# Patient Record
Sex: MALE | Race: WHITE | HISPANIC OR LATINO | Employment: STUDENT | URBAN - METROPOLITAN AREA
[De-identification: names, ages, dates, MRNs, and addresses within clinical notes are randomized per-mention and may not be internally consistent; named-entity substitution may affect disease eponyms.]

---
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
## 2025-03-30 ENCOUNTER — APPOINTMENT (EMERGENCY)
Dept: RADIOLOGY | Facility: HOSPITAL | Age: 20
End: 2025-03-30

## 2025-03-30 ENCOUNTER — HOSPITAL ENCOUNTER (EMERGENCY)
Facility: HOSPITAL | Age: 20
Discharge: HOME | End: 2025-03-30
Attending: EMERGENCY MEDICINE

## 2025-03-30 VITALS
TEMPERATURE: 99.6 F | HEART RATE: 68 BPM | OXYGEN SATURATION: 98 % | SYSTOLIC BLOOD PRESSURE: 120 MMHG | DIASTOLIC BLOOD PRESSURE: 56 MMHG | RESPIRATION RATE: 18 BRPM

## 2025-03-30 DIAGNOSIS — S93.601A RIGHT FOOT SPRAIN: Primary | ICD-10-CM

## 2025-03-30 PROCEDURE — 99284 EMERGENCY DEPT VISIT MOD MDM: CPT | Performed by: EMERGENCY MEDICINE

## 2025-03-30 PROCEDURE — 73630 X-RAY EXAM OF FOOT: CPT

## 2025-03-30 PROCEDURE — 99283 EMERGENCY DEPT VISIT LOW MDM: CPT

## 2025-03-30 RX ORDER — IBUPROFEN 800 MG/1
800 TABLET, FILM COATED ORAL EVERY 8 HOURS PRN
Qty: 15 TABLET | Refills: 0 | Status: SHIPPED | OUTPATIENT
Start: 2025-03-30

## 2025-03-30 RX ORDER — IBUPROFEN 400 MG/1
800 TABLET, FILM COATED ORAL ONCE
Status: COMPLETED | OUTPATIENT
Start: 2025-03-30 | End: 2025-03-30

## 2025-03-30 RX ADMIN — IBUPROFEN 800 MG: 400 TABLET, FILM COATED ORAL at 21:37

## 2025-03-30 NOTE — Clinical Note
Bhavesh Tse was seen and treated in our emergency department on 3/30/2025.            light duty with limiting weight bearing right foot x 3 days    Diagnosis: right foot sprain    Bhavesh  .    He may return on this date: 04/02/2025         If you have any questions or concerns, please don't hesitate to call.      Jo Garcia, DO    ______________________________           _______________          _______________  Hospital Representative                              Date                                Time

## 2025-03-31 NOTE — ED PROVIDER NOTES
"Time reflects when diagnosis was documented in both MDM as applicable and the Disposition within this note       Time User Action Codes Description Comment    3/30/2025 10:26 PM Radha Jo Add [S93.604W] Right foot sprain           ED Disposition       ED Disposition   Discharge    Condition   Stable    Date/Time   Sun Mar 30, 2025 10:26 PM    Comment   Bhavesh Tse Jr. discharge to home/self care.                   Assessment & Plan       Medical Decision Making  20-year-old male presents with right foot pain after falling off his skateboard 1 week ago.  Differential diagnosis includes but not limited to acute ligamentous injury, sprain/strain, fracture, dislocation, foreign body, contusion, plantar fasciitis.    Problems Addressed:  Right foot sprain: acute illness or injury    Amount and/or Complexity of Data Reviewed  Radiology: ordered and independent interpretation performed. Decision-making details documented in ED Course.     Details: X-ray ordered and independently interpreted by me, my interpretation is no acute abnormality    Risk  Prescription drug management.  Risk Details: Patient's x-ray is negative for acute fracture.  Will treat for acute sprain recommend Aircast, rest ice and elevation.  Tylenol/ibuprofen for pain.  Discussed signs and symptoms to return to the department             Medications   ibuprofen (MOTRIN) tablet 800 mg (800 mg Oral Given 3/30/25 2137)       ED Risk Strat Scores              CRAFFT      Flowsheet Row Most Recent Value   CRAFFT Initial Screen: During the past 12 months, did you:    1. Drink any alcohol (more than a few sips)?  No Filed at: 03/30/2025 2043   2. Smoke any marijuana or hashish No Filed at: 03/30/2025 2043   3. Use anything else to get high? (\"anything else\" includes illegal drugs, over the counter and prescription drugs, and things that you sniff or 'jenkins')? No Filed at: 03/30/2025 2043                                          History of Present Illness "       Chief Complaint   Patient presents with    Foot Pain     Right heel pain. States he hasn't been able to put weight on his right heel for a week. He states that he fell off his skateboard a week ago and its gotten more swollen over time. Patient has not taken anything for the pain.        Past Medical History:   Diagnosis Date    Allergic     Allergic rhinitis     Asthma 8/13/2012    Depression 5/1/2023    Eczema     Strep throat     Visual impairment 5/1/2023      Past Surgical History:   Procedure Laterality Date    APPENDECTOMY      CIRCUMCISION        Family History   Problem Relation Age of Onset    No Known Problems Mother     No Known Problems Father       Social History     Tobacco Use    Smoking status: Never     Passive exposure: Yes    Smokeless tobacco: Never   Vaping Use    Vaping status: Never Used   Substance Use Topics    Alcohol use: Never    Drug use: Not Currently     Types: Marijuana      E-Cigarette/Vaping    E-Cigarette Use Never User       E-Cigarette/Vaping Substances      I have reviewed and agree with the history as documented.     20-year-old male presents the emergency department for evaluation of right ankle and heel pain.  Patient states that he was skateboarding 1 week ago when he fell off a skateboard and injured the right foot.  Has been having pain with weightbearing along the lateral aspect and the bottom of the right foot.  He notes some mild swelling of the area.  He denies previous right foot fracture.      History provided by:  Medical records and patient   used: No    Ankle Pain  Location:  Ankle and foot  Time since incident:  1 week  Injury: yes    Mechanism of injury: fall    Fall:     Fall occurred: fell from skateboard.    Impact surface:  [place]    Point of impact:  Feet  Ankle location:  R ankle  Foot location:  R foot  Pain details:     Quality:  Aching and pressure    Radiates to:  Does not radiate    Severity:  Moderate    Onset quality:   Sudden    Duration:  1 week    Timing:  Constant    Progression:  Unchanged  Chronicity:  New  Foreign body present:  No foreign bodies  Prior injury to area:  No  Relieved by:  Nothing  Worsened by:  Bearing weight  Ineffective treatments:  None tried  Associated symptoms: swelling    Associated symptoms: no back pain, no decreased ROM, no fever, no muscle weakness, no numbness and no stiffness    Risk factors: no frequent fractures        Review of Systems   Constitutional:  Negative for fever.   Musculoskeletal:  Positive for gait problem. Negative for back pain and stiffness.   Neurological:  Negative for dizziness and weakness.   Psychiatric/Behavioral:  The patient is not nervous/anxious.    All other systems reviewed and are negative.          Objective       ED Triage Vitals   Temperature Pulse Blood Pressure Respirations SpO2 Patient Position - Orthostatic VS   03/30/25 2043 03/30/25 2043 03/30/25 2043 03/30/25 2043 03/30/25 2043 03/30/25 2043   99.6 °F (37.6 °C) 68 120/56 18 98 % Sitting      Temp Source Heart Rate Source BP Location FiO2 (%) Pain Score    03/30/25 2043 03/30/25 2043 03/30/25 2043 -- 03/30/25 2137    Oral Monitor Right arm  4      Vitals      Date and Time Temp Pulse SpO2 Resp BP Pain Score FACES Pain Rating User   03/30/25 2137 -- -- -- -- -- 4 -- SG   03/30/25 2043 99.6 °F (37.6 °C) 68 98 % 18 120/56 -- -- ND            Physical Exam  Vitals and nursing note reviewed.   Constitutional:       Appearance: He is well-developed.   HENT:      Head: Normocephalic.      Right Ear: External ear normal.      Left Ear: External ear normal.      Nose: Nose normal.      Mouth/Throat:      Mouth: Mucous membranes are moist.      Pharynx: Oropharynx is clear.   Eyes:      General: Lids are normal.      Extraocular Movements: Extraocular movements intact.      Pupils: Pupils are equal, round, and reactive to light.   Pulmonary:      Effort: Pulmonary effort is normal. No respiratory distress.    Musculoskeletal:         General: No deformity. Normal range of motion.      Cervical back: Normal range of motion and neck supple.      Right foot: Normal range of motion.        Feet:    Feet:      Right foot:      Skin integrity: Skin integrity normal.   Skin:     General: Skin is warm and dry.   Neurological:      Mental Status: He is alert and oriented to person, place, and time.   Psychiatric:         Mood and Affect: Mood normal.         Results Reviewed       None            XR foot 3+ views RIGHT   Final Interpretation by Navid Fernandez MD (03/31 0556)      No acute osseous abnormality.         Computerized Assisted Algorithm (CAA) may have been used to analyze all applicable images.         Workstation performed: [id number]             Splint application    Date/Time: 3/31/2025 4:31 PM    Performed by: Jo Garcia DO  Authorized by: Jo Garcia DO    Other Assisting Provider: Yes (comment)    Verbal consent obtained?: Yes    Consent given by:  Patient  Patient identity confirmed:  Verbally with patient  Pre-procedure details:     Sensation:  Normal  Procedure details:     Laterality:  Right    Location:  Ankle    Ankle:  R ankle    Strapping: No    Post-procedure details:     Pain:  Unchanged    Sensation:  Normal    Patient tolerance of procedure:  Tolerated well, no immediate complications  Comments:      Patient given Aircast and ambulated out of the department without difficulty.      ED Medication and Procedure Management   Prior to Admission Medications   Prescriptions Last Dose Informant Patient Reported? Taking?   cetirizine (ZyrTEC) 10 mg tablet   No No   Sig: Take 1 tablet (10 mg total) by mouth daily   hydrocortisone-pramoxine (PROCTOFOAM-HC) 1-1 % FOAM rectal foam   No No   Sig: Insert 1 applicator into the rectum 2 (two) times a day for 7 days   ibuprofen (MOTRIN) 100 mg/5 mL suspension   Yes No   Sig: Take 5 mg/kg by mouth as needed for mild pain   ketotifen (ZADITOR) 0.025 % ophthalmic  solution   No No   Sig: Administer 1 drop to both eyes 2 (two) times a day   Patient not taking: Reported on 3/20/2024   polyethylene glycol (MIRALAX) 17 g packet   No No   Sig: Take 17 g by mouth daily for 14 days   sertraline (Zoloft) 25 mg tablet   No No   Sig: Take 1 tablet (25 mg total) by mouth daily      Facility-Administered Medications: None     Discharge Medication List as of 3/30/2025 10:28 PM        START taking these medications    Details   ibuprofen (MOTRIN) 800 mg tablet Take 1 tablet (800 mg total) by mouth every 8 (eight) hours as needed for mild pain, Starting Sun 3/30/2025, Normal           CONTINUE these medications which have NOT CHANGED    Details   cetirizine (ZyrTEC) 10 mg tablet Take 1 tablet (10 mg total) by mouth daily, Starting Mon 7/19/2021, Until Tue 7/19/2022, Normal      hydrocortisone-pramoxine (PROCTOFOAM-HC) 1-1 % FOAM rectal foam Insert 1 applicator into the rectum 2 (two) times a day for 7 days, Starting Sun 8/13/2023, Until Sun 8/20/2023, Print      ibuprofen (MOTRIN) 100 mg/5 mL suspension Take 5 mg/kg by mouth as needed for mild pain, Historical Med      ketotifen (ZADITOR) 0.025 % ophthalmic solution Administer 1 drop to both eyes 2 (two) times a day, Starting Wed 3/20/2019, Normal      polyethylene glycol (MIRALAX) 17 g packet Take 17 g by mouth daily for 14 days, Starting Sun 8/13/2023, Until Sun 8/27/2023, Normal      sertraline (Zoloft) 25 mg tablet Take 1 tablet (25 mg total) by mouth daily, Starting Mon 5/1/2023, Until Tue 4/30/2024, Normal           No discharge procedures on file.  ED SEPSIS DOCUMENTATION   Time reflects when diagnosis was documented in both MDM as applicable and the Disposition within this note       Time User Action Codes Description Comment    3/30/2025 10:26 PM Jo Garcia Add [S93.601A] Right foot sprain                  Jo Garcia DO  03/31/25 1632